# Patient Record
Sex: FEMALE | Race: WHITE | Employment: PART TIME | URBAN - NONMETROPOLITAN AREA
[De-identification: names, ages, dates, MRNs, and addresses within clinical notes are randomized per-mention and may not be internally consistent; named-entity substitution may affect disease eponyms.]

---

## 2024-02-14 ENCOUNTER — HOSPITAL ENCOUNTER (EMERGENCY)
Age: 34
Discharge: HOME | End: 2024-02-14
Attending: EMERGENCY MEDICINE
Payer: MEDICAID

## 2024-02-14 ENCOUNTER — APPOINTMENT (OUTPATIENT)
Dept: GENERAL RADIOLOGY | Age: 34
End: 2024-02-14
Payer: MEDICAID

## 2024-02-14 VITALS
RESPIRATION RATE: 17 BRPM | TEMPERATURE: 98.3 F | SYSTOLIC BLOOD PRESSURE: 115 MMHG | HEIGHT: 60 IN | OXYGEN SATURATION: 100 % | HEART RATE: 78 BPM | DIASTOLIC BLOOD PRESSURE: 74 MMHG

## 2024-02-14 DIAGNOSIS — R05.1 ACUTE COUGH: ICD-10-CM

## 2024-02-14 DIAGNOSIS — J10.1 INFLUENZA B: Primary | ICD-10-CM

## 2024-02-14 LAB
FLUAV RNA RESP QL NAA+PROBE: NOT DETECTED
FLUBV RNA RESP QL NAA+PROBE: DETECTED
S PYO AG THROAT QL: NEGATIVE
S PYO THROAT QL CULT: NORMAL
SARS-COV-2 RNA RESP QL NAA+PROBE: NOT DETECTED

## 2024-02-14 PROCEDURE — 71046 X-RAY EXAM CHEST 2 VIEWS: CPT

## 2024-02-14 PROCEDURE — 99284 EMERGENCY DEPT VISIT MOD MDM: CPT

## 2024-02-14 PROCEDURE — 6360000002 HC RX W HCPCS: Performed by: EMERGENCY MEDICINE

## 2024-02-14 PROCEDURE — 87070 CULTURE OTHR SPECIMN AEROBIC: CPT

## 2024-02-14 PROCEDURE — 87880 STREP A ASSAY W/OPTIC: CPT

## 2024-02-14 PROCEDURE — 87636 SARSCOV2 & INF A&B AMP PRB: CPT

## 2024-02-14 PROCEDURE — 96372 THER/PROPH/DIAG INJ SC/IM: CPT

## 2024-02-14 RX ORDER — KETOROLAC TROMETHAMINE 30 MG/ML
15 INJECTION, SOLUTION INTRAMUSCULAR; INTRAVENOUS ONCE
Status: COMPLETED | OUTPATIENT
Start: 2024-02-14 | End: 2024-02-14

## 2024-02-14 RX ADMIN — KETOROLAC TROMETHAMINE 15 MG: 30 INJECTION INTRAMUSCULAR; INTRAVENOUS at 16:18

## 2024-02-14 NOTE — DISCHARGE INSTRUCTIONS
Call today or tomorrow to schedule an appointment to establish primary care.    Drink plenty of liquids; avoid caffeine type products (pop / coffee / tea).  Avoid drinking alcohol.  Use ibuprofen or Tylenol (unless prescribed medications that have Tylenol in it) for pain.  You can take over the counter Ibuprofen (advil) tablets (4 every 8 hours or 3 every 6 hours or 2 every 4 hours).  Eat chicken noodle soup.  Mineral diet (avoid spicy foods).    Take over the counter medications for any nasal congestion / sore throat type symptoms.  Mix 1 teaspoon of maalox and 1 teaspoon of liquid benadryl, gargle for 1 minute then swallow.  You can also use Cepacol lozenge or Chloraseptic throat spray to help soothe your throat.    Return to the emergency department for worsening of pain, fever > 101.5, excessive nausea or vomiting, any other care or concern.

## 2024-02-14 NOTE — ED PROVIDER NOTES
PATIENT NAME: David Marin  MRN: [id number]  : 1990  GASTELUM: 2024    I performed a history and physical examination of the patient and discussed management with the Resident. I reviewed the Resident's note and agree with the documented findings and plan of care. Any areas of disagreement are noted on the chart. I was personally present for the key portions of any procedures and have documented in the chart those procedures where I was not present during the key portions. I have reviewed the emergency nurses triage note and agree with the chief complaint, past medical history, past surgical history, allergies, medications, social and family history as documented unless otherwise noted below.    MEDICAL DEDISION MAKING (MDM)     David Marin is a 33 y.o. female who presents to Emergency Department with Cough, Nasal Congestion, and Pharyngitis     Flulike symptoms including cough, nasal congestion, and sore throat for about a week.    Physical exam: AVSS, nontoxic looking, heart and the lungs are unremarkable, soft abdomen without tenderness, neurologically intact.    ED workups positive for influenza B.    Patient is reassured and discharged with home supportive care.  PCP follow-up as needed.    Vitals:    24 1536   BP: 115/74   Pulse: 78   Resp: 17   Temp: 98.3 °F (36.8 °C)   TempSrc: Oral   SpO2: 100%   Height: 1.524 m (5')     Labs Reviewed   COVID-19 & INFLUENZA COMBO - Abnormal; Notable for the following components:       Result Value    INFLUENZA B DETECTED (*)     All other components within normal limits   CULTURE, THROAT    Narrative:     Source: Specimen not received       Site:           Current Antibiotics:   GROUP A STREP, REFLEX     XR CHEST (2 VW)   Final Result   There is no acute intrathoracic process.               **This report has been created using voice recognition software. It may contain minor errors which are inherent in voice recognition technology.**      Final report

## 2024-02-14 NOTE — ED NOTES
Pt arrives to ED from home with c/o congestion, cough and not feeling well. Pt states it has been ongoing for about a week

## 2024-02-14 NOTE — ED PROVIDER NOTES
[facility] EMERGENCY DEPT    EMERGENCY MEDICINE      Pt Name: David Marin  MRN: [id number]  Birthdate 1990  Date of evaluation: 2/14/2024  Treating Physician: Dr. Pham  Resident Physician: Tati Duffy MD    CHIEF COMPLAINT       Chief Complaint   Patient presents with    Cough    Nasal Congestion    Pharyngitis     History obtained from chart review and the patient.    HISTORY OF PRESENT ILLNESS    HPI    David Marin is a 33 y.o. 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  Patient stated that for the past week she has had a productive cough with clear sputum but today noticed that there was some blood tinged in the sputum and was concerned so she came to the ED. she also is describing generalized myalgias over the past week.  She is denying any chest pain, nausea, vomiting, shortness of breath, fever, abdominal pain, dysuria.    The patient has no other acute complaints at this time.    PAST MEDICAL AND SURGICAL HISTORY   No past medical history on file.    No past surgical history on file.    CURRENT MEDICATIONS     Previous Medications    No medications on file       ALLERGIES   No Known Allergies    FAMILY HISTORY   No family history on file.    SOCIAL HISTORY     Social History     Tobacco Use    Smoking status: Every Day     Current packs/day: 0.50     Types: Cigarettes    Smokeless tobacco: Never   Substance Use Topics    Alcohol use: Not Currently    Drug use: Never       PHYSICAL EXAM     ED Triage Vitals [02/14/24 1536]   BP Temp Temp Source Pulse Respirations SpO2 Height Weight   115/74 98.3 °F (36.8 °C) Oral 78 17 100 % 1.524 m (5') --     There is no height or weight on file to calculate BMI.     Initial vital signs and nursing assessment reviewed and normal.    Physical Exam  Vitals and nursing note reviewed.   Constitutional:       General: She is not in acute distress.

## 2024-02-16 LAB — BACTERIA THROAT AEROBE CULT: NORMAL
